# Patient Record
Sex: MALE | Race: WHITE | NOT HISPANIC OR LATINO | Employment: FULL TIME | URBAN - METROPOLITAN AREA
[De-identification: names, ages, dates, MRNs, and addresses within clinical notes are randomized per-mention and may not be internally consistent; named-entity substitution may affect disease eponyms.]

---
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
## 2021-11-20 ENCOUNTER — OFFICE VISIT (OUTPATIENT)
Dept: URGENT CARE | Facility: PHYSICIAN GROUP | Age: 21
End: 2021-11-20
Payer: OTHER GOVERNMENT

## 2021-11-20 ENCOUNTER — HOSPITAL ENCOUNTER (EMERGENCY)
Facility: MEDICAL CENTER | Age: 21
End: 2021-11-20
Attending: EMERGENCY MEDICINE
Payer: OTHER GOVERNMENT

## 2021-11-20 VITALS
SYSTOLIC BLOOD PRESSURE: 124 MMHG | WEIGHT: 165 LBS | HEIGHT: 72 IN | DIASTOLIC BLOOD PRESSURE: 72 MMHG | OXYGEN SATURATION: 99 % | TEMPERATURE: 99.1 F | HEART RATE: 96 BPM | BODY MASS INDEX: 22.35 KG/M2 | RESPIRATION RATE: 12 BRPM

## 2021-11-20 VITALS
BODY MASS INDEX: 21.38 KG/M2 | RESPIRATION RATE: 14 BRPM | OXYGEN SATURATION: 95 % | WEIGHT: 157.85 LBS | DIASTOLIC BLOOD PRESSURE: 75 MMHG | TEMPERATURE: 98.8 F | SYSTOLIC BLOOD PRESSURE: 126 MMHG | HEART RATE: 67 BPM | HEIGHT: 72 IN

## 2021-11-20 DIAGNOSIS — H18.891 CORNEAL RUST RING OF RIGHT EYE: ICD-10-CM

## 2021-11-20 DIAGNOSIS — T15.91XA FOREIGN BODY OF RIGHT EYE, INITIAL ENCOUNTER: ICD-10-CM

## 2021-11-20 PROCEDURE — 700101 HCHG RX REV CODE 250: Performed by: EMERGENCY MEDICINE

## 2021-11-20 PROCEDURE — 99283 EMERGENCY DEPT VISIT LOW MDM: CPT

## 2021-11-20 PROCEDURE — 99213 OFFICE O/P EST LOW 20 MIN: CPT | Performed by: FAMILY MEDICINE

## 2021-11-20 RX ORDER — ERYTHROMYCIN 5 MG/G
1 OINTMENT OPHTHALMIC 3 TIMES DAILY
Qty: 1 G | Refills: 0 | Status: SHIPPED | OUTPATIENT
Start: 2021-11-20 | End: 2021-11-23

## 2021-11-20 RX ORDER — COVID-19 MOLECULAR TEST ASSAY
KIT MISCELLANEOUS
COMMUNITY
Start: 2021-11-12 | End: 2021-11-20

## 2021-11-20 RX ORDER — ERYTHROMYCIN 5 MG/G
OINTMENT OPHTHALMIC ONCE
Status: COMPLETED | OUTPATIENT
Start: 2021-11-20 | End: 2021-11-20

## 2021-11-20 RX ADMIN — ERYTHROMYCIN: 5 OINTMENT OPHTHALMIC at 20:34

## 2021-11-20 RX ADMIN — FLUORESCEIN SODIUM 1 MG: 1 STRIP OPHTHALMIC at 20:13

## 2021-11-20 ASSESSMENT — LIFESTYLE VARIABLES
TOTAL SCORE: 0
EVER HAD A DRINK FIRST THING IN THE MORNING TO STEADY YOUR NERVES TO GET RID OF A HANGOVER: NO
DO YOU DRINK ALCOHOL: NO
HAVE PEOPLE ANNOYED YOU BY CRITICIZING YOUR DRINKING: NO
EVER FELT BAD OR GUILTY ABOUT YOUR DRINKING: NO
HAVE YOU EVER FELT YOU SHOULD CUT DOWN ON YOUR DRINKING: NO
TOTAL SCORE: 0
CONSUMPTION TOTAL: INCOMPLETE
TOTAL SCORE: 0

## 2021-11-21 NOTE — ED NOTES
Discharge teaching done with pt, verbalized understanding. Prescription given for erythromycin eye ointment with teaching. Pt instructed to follow up with ophthalmology as directed but return to ER for any worsening condition or if unable to follow up as directed. Pt denies further questions or concerns at time of discharge. Ambulates out with steady gait.

## 2021-11-21 NOTE — ED TRIAGE NOTES
"Chief Complaint   Patient presents with   • Foreign Body in Eye     Pt was seen at  and sent here for further treatment. Pt got metal in his eye on Thurs. Pt evaluated and told he has \"corneal rust ring of right eye\". Pt complaining of some pain.      /71   Pulse (!) 107   Temp 36.9 °C (98.4 °F) (Oral)   Resp 16   Ht 1.829 m (6')   Wt 71.6 kg (157 lb 13.6 oz)   SpO2 97%   BMI 21.41 kg/m²     Patient arrived to ED for the above complaint. Triage process explained to patient. Patient placed in lobby and told to notify staff of any changes.   "

## 2021-11-21 NOTE — ED PROVIDER NOTES
ED Provider Note    Chief Complaint:   Right eye foreign body    HPI:  Kaleb Mcmahon is a very pleasant 21-year-old gentleman who presents to the emergency department sent from urgent care for evaluation of a rust ring in the right eye.  He does metal work, and welding.  Suspect that he got a small piece of metal or sliver of metal in the right eye at some point for the past week.  He developed eye pain on Thursday, 3 days prior to arrival.  He has had persistent irritation and discomfort in the right eye since that time.  He has no significant vision changes.  He is not had any headaches, no nausea, no vomiting.  He went to urgent care, where they diagnosed him with a rust ring and sent him to the emergency department.    Review of Systems:  See HPI for pertinent positives and negatives. All other systems negative.    Past Medical History:   has a past medical history of Migraine.    Social History:  Social History     Tobacco Use   • Smoking status: Never Smoker   • Smokeless tobacco: Never Used   • Tobacco comment: continue to avoid   Vaping Use   • Vaping Use: Never used   Substance and Sexual Activity   • Alcohol use: Yes     Alcohol/week: 0.0 oz     Comment: rare   • Drug use: Yes     Types: Marijuana   • Sexual activity: Not on file       Surgical History:   has a past surgical history that includes hypospadias repair.    Current Medications:  Home Medications     Reviewed by Lisa Nevarez R.N. (Registered Nurse) on 11/20/21 at 1831  Med List Status: Not Addressed   Medication Last Dose Status        Patient Simone Taking any Medications                       Allergies:  No Known Allergies    Physical Exam:  Vital Signs: /75   Pulse 67   Temp 37.1 °C (98.8 °F) (Temporal)   Resp 14   Ht 1.829 m (6')   Wt 71.6 kg (157 lb 13.6 oz)   SpO2 95%   BMI 21.41 kg/m²   Constitutional: Alert, no acute distress  HENT: Normocephalic, mask in place  Pupils: equal, round and reactive  EOM:  intact  Conjunctiva: Mild conjunctival injection in the right eye  Foreign body: Punctate foreign body present with small corneal rust ring overlying the iris  Corneal abrasions, ulcerations or lacerations: absent with no stain uptake, no evidence of keratitis  Neck: Supple, normal range of motion, no stridor  Cardiovascular: No tachycardia  Pulmonary: No respiratory distress, normal work of breathing  Skin: Warm, dry, no rashes or lesions  Musculoskeletal: Normal range of motion in all extremities, no swelling or deformity noted  Neurologic: Alert, oriented, normal speech, normal motor function    Medical records reviewed for continuity of care.  No recent visits for similar symptoms available for review.  Urgent care note is not available for review.    ED Medications Administered:  Medications   erythromycin ophthalmic ointment (has no administration in time range)   fluorescein ophthalmic strip 1 mg (1 mg Both Eyes Given 11/20/21 2013)     MDM:  Mr. Mcmahon presents to the emergency department sent from urgent care for evaluation of a small corneal foreign body with rust ring.  I do not see any additional corneal abrasions nor lacerations, no stain uptake, no abnormalities on slit-lamp exam to suggest keratitis.  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  His pain is minimal, and can be controlled with Tylenol or ibuprofen.  He is referred to ophthalmology, Dr. Garcia is on call this evening.  He is counseled to call at the opening of business hours to schedule a follow-up appointment, and to return to the emergency department for assistance with an outpatient appointment if he is unable to schedule follow-up for any reason. Return precautions were discussed with the patient, and provided in written form with the patient's discharge instructions.  I did discharge him with a prescription for erythromycin ointment.    Personal protective equipment including N95 surgical  respirator, goggles, and gloves were used during this encounter.       Disposition:  Discharge home in stable condition    Final Impression:  1. Corneal rust ring of right eye        Electronically signed by: Marie Moses MD, 11/20/2021 8:24 PM

## 2021-11-21 NOTE — DISCHARGE INSTRUCTIONS
Please follow-up with the ophthalmologist listed above.  Call Monday morning at the opening of business hours, let them know you are seen in the emergency department and diagnosed with a corneal rust ring, due to a foreign body that has been in the eye for 3 days. If they aren't able to see you in clinic within 24 to 48 hours, please return to the emergency department Monday morning for assistance with setting up a follow-up appointment.  Please use the eye ointment as prescribed for comfort.  Return to the emergency department if you develop any new or worsening symptoms including worsening pain, drainage from the eye, redness, swelling around the eye, or any further concerns.

## 2021-11-21 NOTE — ED NOTES
"Pt to bed 67 ambulating with steady gait. Agree with triage nurse note. Pt reports UC \"got it out\" but sent for further evaluation r/t \"rust ring\". Sclera red to R eye. PERRL. Denies blurred vision, reports eye uncomfortable but denies pain.   Chart up for MD to see.   "

## 2021-11-25 ASSESSMENT — ENCOUNTER SYMPTOMS
WEIGHT LOSS: 0
FOCAL WEAKNESS: 0
SENSORY CHANGE: 0
BLURRED VISION: 0
DOUBLE VISION: 0

## 2021-11-25 NOTE — PROGRESS NOTES
Subjective     Kaleb Mcmahon is a 21 y.o. male who presents with Eye Problem (right eye redness and pain, got something in right on thursday night )            2 days small metallic foreign body right eye.  Eye is red irritated and draining.  + Photophobia.  He got metal in eye while he was grinding.  No vision loss.  No other aggravating or alleviating factors.       Review of Systems   Constitutional: Negative for malaise/fatigue and weight loss.   Eyes: Negative for blurred vision and double vision.   Neurological: Negative for sensory change and focal weakness.              Objective     /72 (BP Location: Right arm, Patient Position: Sitting, BP Cuff Size: Adult)   Pulse 96   Temp 37.3 °C (99.1 °F) (Temporal)   Resp 12   Ht 1.829 m (6')   Wt 74.8 kg (165 lb)   SpO2 99%   BMI 22.38 kg/m²      Physical Exam  Constitutional:       Appearance: Normal appearance.   Eyes:     Skin:     General: Skin is warm and dry.   Neurological:      Mental Status: He is alert.                             Assessment & Plan        1. Corneal rust ring of right eye    - Referral to Ophthalmology    2. Foreign body of right eye, initial encounter    - Referral to Ophthalmology    Differential diagnosis, natural history, supportive care, and indications for immediate follow-up discussed at length.

## 2022-11-04 ENCOUNTER — HOSPITAL ENCOUNTER (EMERGENCY)
Facility: MEDICAL CENTER | Age: 22
End: 2022-11-04
Attending: EMERGENCY MEDICINE
Payer: COMMERCIAL

## 2022-11-04 VITALS
TEMPERATURE: 96.7 F | DIASTOLIC BLOOD PRESSURE: 80 MMHG | OXYGEN SATURATION: 98 % | BODY MASS INDEX: 21.2 KG/M2 | SYSTOLIC BLOOD PRESSURE: 136 MMHG | HEART RATE: 64 BPM | RESPIRATION RATE: 18 BRPM | WEIGHT: 156.31 LBS

## 2022-11-04 DIAGNOSIS — S05.02XA ABRASION OF LEFT CORNEA, INITIAL ENCOUNTER: ICD-10-CM

## 2022-11-04 DIAGNOSIS — H18.892 CORNEAL RUST RING OF LEFT EYE: ICD-10-CM

## 2022-11-04 PROCEDURE — 99283 EMERGENCY DEPT VISIT LOW MDM: CPT

## 2022-11-04 PROCEDURE — 700101 HCHG RX REV CODE 250: Performed by: EMERGENCY MEDICINE

## 2022-11-04 RX ORDER — PROPARACAINE HYDROCHLORIDE 5 MG/ML
1 SOLUTION/ DROPS OPHTHALMIC ONCE
Status: COMPLETED | OUTPATIENT
Start: 2022-11-04 | End: 2022-11-04

## 2022-11-04 RX ORDER — POLYMYXIN B SULFATE AND TRIMETHOPRIM 1; 10000 MG/ML; [USP'U]/ML
2 SOLUTION OPHTHALMIC EVERY 4 HOURS
Qty: 10 ML | Refills: 0 | Status: SHIPPED | OUTPATIENT
Start: 2022-11-04 | End: 2022-11-09

## 2022-11-04 RX ADMIN — PROPARACAINE HYDROCHLORIDE 1 DROP: 5 SOLUTION/ DROPS OPHTHALMIC at 20:00

## 2022-11-04 RX ADMIN — FLUORESCEIN SODIUM 1 MG: 1 STRIP OPHTHALMIC at 20:00

## 2022-11-04 ASSESSMENT — LIFESTYLE VARIABLES
HAVE PEOPLE ANNOYED YOU BY CRITICIZING YOUR DRINKING: NO
EVER HAD A DRINK FIRST THING IN THE MORNING TO STEADY YOUR NERVES TO GET RID OF A HANGOVER: NO
CONSUMPTION TOTAL: NEGATIVE
HAVE YOU EVER FELT YOU SHOULD CUT DOWN ON YOUR DRINKING: NO
DO YOU DRINK ALCOHOL: YES
AVERAGE NUMBER OF DAYS PER WEEK YOU HAVE A DRINK CONTAINING ALCOHOL: 0
TOTAL SCORE: 0
ON A TYPICAL DAY WHEN YOU DRINK ALCOHOL HOW MANY DRINKS DO YOU HAVE: 0
TOTAL SCORE: 0
HOW MANY TIMES IN THE PAST YEAR HAVE YOU HAD 5 OR MORE DRINKS IN A DAY: 0
EVER FELT BAD OR GUILTY ABOUT YOUR DRINKING: NO
TOTAL SCORE: 0

## 2022-11-05 NOTE — ED TRIAGE NOTES
Chief Complaint   Patient presents with    Foreign Body in Eye     Metal in left eye after drilling steel last night     Pt reports that vision is intact. Pt reports that he rinsed eye, but can still feel foreign body.   BP (!) 142/70   Pulse 92   Temp 36.9 °C (98.5 °F) (Temporal)   Resp 16   Wt 70.9 kg (156 lb 4.9 oz)   SpO2 97%   Pt informed of wait times. Educated on triage process.  Asked to return to triage RN for any new or worsening of symptoms. Thanked for patience.

## 2022-11-05 NOTE — ED PROVIDER NOTES
ED Provider Note    CHIEF COMPLAINT  Chief Complaint   Patient presents with    Foreign Body in Eye     Metal in left eye after drilling steel last night        HPI  Kaleb Mcmahon is a 22 y.o. male who presents to the ED with complaints of foreign body sensation of the left eye.  The patient states that he was drilling some steel last night when he felt a marina go into his eye.  Patient tried to use a cotton tip about an hour or 2 after that to remove what he saw was a small marina of steel.  Patient continues to have pain to that left eye describes some redness but no discharge or visual acuity changes or any other symptoms presents for evaluation tetanus is up-to-date.    REVIEW OF SYSTEMS  See HPI for further details. All other systems are negative.     PAST MEDICAL HISTORY  Past Medical History:   Diagnosis Date    Migraine        FAMILY HISTORY  Family History   Problem Relation Age of Onset    Heart Disease Maternal Grandfather     Diabetes Neg Hx      Patient's family history has been discussed and is been found to be noncontributory to his present illness  SOCIAL HISTORY  Social History     Socioeconomic History    Marital status: Single   Tobacco Use    Smoking status: Never    Smokeless tobacco: Never    Tobacco comments:     continue to avoid   Vaping Use    Vaping Use: Never used   Substance and Sexual Activity    Alcohol use: Yes     Alcohol/week: 0.0 oz     Comment: rare    Drug use: Yes     Types: Marijuana      Pcp Pt States None        SURGICAL HISTORY  Past Surgical History:   Procedure Laterality Date    HYPOSPADIAS REPAIR         CURRENT MEDICATIONS  Home Medications       Reviewed by Alejandra Howell R.N. (Registered Nurse) on 11/04/22 at 1842  Med List Status: Partial     Medication Last Dose Status        Patient Simone Taking any Medications                           ALLERGIES  No Known Allergies    PHYSICAL EXAM  VITAL SIGNS: BP (!) 142/70   Pulse 92   Temp 36.9 °C (98.5 °F)  (Temporal)   Resp 16   Wt 70.9 kg (156 lb 4.9 oz)   SpO2 97%   BMI 21.20 kg/m²    Pulse Ox interpretation nonhypoxic    Constitutional: Well developed, Well nourished, No acute distress, Non-toxic appearance.   HENT: Normocephalic, Atraumatic, Bilateral external ears normal, bilateral tympanic membranes normal, Oropharynx moist, No oral exudates, Nose normal.   Eyes:  Lids/Lashes normal   Sclera slightly injected on the left the right is normal   Cornea patient has a small rust ring in the left lower quadrant of the cornea with a small abrasion surrounding it that is about 1 mm in diameter of the abrasion and the rust ring is very small less than a millimeter.  There is no overt foreign body seen.   Anterior Chamber normal no signs of cell and flare   Angle normal  Right Eye: 20/15  Left Eye: 20/25  Both Eyes: 20/20   Skin: Warm, Dry, No erythema,   Extremities: Intact distal pulses, no clubbing, no cyanosis, no edema, nontender.      RADIOLOGY/PROCEDURES  None    COURSE & MEDICAL DECISION MAKING  Pertinent Labs & Imaging studies reviewed. (See chart for details)  Patient does have a small corneal abrasion around the area of the rust ring it appears the patient did remove his own foreign body however there is still a retained rust ring as well as the corneal abrasion so we will start the patient on Polytrim ophthalmic.  Patient is to follow-up with ophthalmologist in 3 to 5 days for recheck and to discuss removal of the rust ring.  The patient is return if any symptoms worsen.  Tetanus is up-to-date.  FINAL IMPRESSION  1. Abrasion of left cornea, initial encounter  polymixin-trimethoprim (POLYTRIM) 09605-3.1 UNIT/ML-% Solution      2. Corneal rust ring of left eye           The patient will return for new or worsening symptoms and is stable at the time of discharge.    The patient is referred to a primary physician for blood pressure management, diabetic screening, and for all other preventative health  concerns.        DISPOSITION:  Patient will be discharged home in stable condition.    FOLLOW UP:  Mumtaz Irving M.D.  [address] [facility] [address]  [place] [address]  [phone]    Schedule an appointment as soon as possible for a visit   For re-check next week,, Return if any symptoms worsen      OUTPATIENT MEDICATIONS:  New Prescriptions    POLYMIXIN-TRIMETHOPRIM (POLYTRIM) 35962-8.1 UNIT/ML-% SOLUTION    Administer 2 Drops into the left eye every 4 hours for 5 days. Place 2 drops in affected eye(s) for 5-7 days.             Electronically signed by: Dionte Noriega M.D., 11/4/2022 8:05 PM

## 2022-11-05 NOTE — ED NOTES
Discharge teaching and paperwork provided regarding corneal abrasion and all questions/concerns answered. VSS,  Given information regarding home care and reasons to follow up with ED or primary MD. Patient provided ointment Rx. Patient discharged to the care of spouse  and ambulated out of the ED.

## 2024-02-09 ENCOUNTER — OFFICE VISIT (OUTPATIENT)
Dept: URGENT CARE | Facility: PHYSICIAN GROUP | Age: 24
End: 2024-02-09
Payer: COMMERCIAL

## 2024-02-09 VITALS
OXYGEN SATURATION: 96 % | DIASTOLIC BLOOD PRESSURE: 74 MMHG | WEIGHT: 152 LBS | BODY MASS INDEX: 20.59 KG/M2 | HEART RATE: 69 BPM | RESPIRATION RATE: 16 BRPM | TEMPERATURE: 98.4 F | SYSTOLIC BLOOD PRESSURE: 122 MMHG | HEIGHT: 72 IN

## 2024-02-09 DIAGNOSIS — H10.31 ACUTE BACTERIAL CONJUNCTIVITIS OF RIGHT EYE: ICD-10-CM

## 2024-02-09 DIAGNOSIS — H66.002 NON-RECURRENT ACUTE SUPPURATIVE OTITIS MEDIA OF LEFT EAR WITHOUT SPONTANEOUS RUPTURE OF TYMPANIC MEMBRANE: ICD-10-CM

## 2024-02-09 PROCEDURE — 3074F SYST BP LT 130 MM HG: CPT | Performed by: PHYSICIAN ASSISTANT

## 2024-02-09 PROCEDURE — 3078F DIAST BP <80 MM HG: CPT | Performed by: PHYSICIAN ASSISTANT

## 2024-02-09 PROCEDURE — 99214 OFFICE O/P EST MOD 30 MIN: CPT | Performed by: PHYSICIAN ASSISTANT

## 2024-02-09 RX ORDER — AMOXICILLIN 875 MG/1
875 TABLET, COATED ORAL 2 TIMES DAILY
Qty: 14 TABLET | Refills: 0 | Status: SHIPPED | OUTPATIENT
Start: 2024-02-09 | End: 2024-02-16

## 2024-02-09 RX ORDER — POLYMYXIN B SULFATE AND TRIMETHOPRIM 1; 10000 MG/ML; [USP'U]/ML
1 SOLUTION OPHTHALMIC EVERY 4 HOURS
Qty: 10 ML | Refills: 0 | Status: SHIPPED | OUTPATIENT
Start: 2024-02-09 | End: 2024-02-16

## 2024-02-09 ASSESSMENT — ENCOUNTER SYMPTOMS
EYE PAIN: 0
BLURRED VISION: 0
EYE REDNESS: 1
COUGH: 0
FEVER: 0
DOUBLE VISION: 0
PHOTOPHOBIA: 0
EYE DISCHARGE: 1
CHILLS: 0

## 2024-02-09 ASSESSMENT — VISUAL ACUITY: OU: 1

## 2024-02-09 NOTE — PROGRESS NOTES
Subjective:   Kaleb Mcmahon is a 24 y.o. male who presents today with   Chief Complaint   Patient presents with    Otalgia     Left Ear x 1 day    Pink Eye     Right eye x 1 day       Otalgia   There is pain in the left ear. This is a new problem. The current episode started today. The problem occurs constantly. The problem has been unchanged. There has been no fever. Pertinent negatives include no coughing. He has tried nothing for the symptoms. The treatment provided no relief.     Patient denies any recent injury or trauma to his eye.  He states yesterday he started having discharge from the right eye.  He does not wear contacts.    PMH:  has a past medical history of Migraine.  MEDS:   Current Outpatient Medications:     amoxicillin (AMOXIL) 875 MG tablet, Take 1 Tablet by mouth 2 times a day for 7 days., Disp: 14 Tablet, Rfl: 0    polymixin-trimethoprim (POLYTRIM) 36160-8.1 UNIT/ML-% Solution, Administer 1 Drop into the right eye every 4 hours for 7 days., Disp: 10 mL, Rfl: 0  ALLERGIES: No Known Allergies  SURGHX:   Past Surgical History:   Procedure Laterality Date    HYPOSPADIAS REPAIR       SOCHX:  reports that he has never smoked. He has never used smokeless tobacco. He reports current alcohol use. He reports current drug use. Drug: Marijuana.  FH: Reviewed with patient, not pertinent to this visit.       Review of Systems   Constitutional:  Negative for chills and fever.   HENT:  Positive for ear pain.    Eyes:  Positive for discharge and redness. Negative for blurred vision, double vision, photophobia and pain.   Respiratory:  Negative for cough.         Objective:   /74 (BP Location: Right arm, Patient Position: Sitting, BP Cuff Size: Adult)   Pulse 69   Temp 36.9 °C (98.4 °F) (Temporal)   Resp 16   Ht 1.829 m (6')   Wt 68.9 kg (152 lb)   SpO2 96%   BMI 20.61 kg/m²   Physical Exam  Vitals and nursing note reviewed.   Constitutional:       General: He is not in acute  distress.     Appearance: Normal appearance. He is well-developed. He is not ill-appearing or toxic-appearing.   HENT:      Head: Normocephalic and atraumatic.      Right Ear: Hearing, tympanic membrane and ear canal normal.      Left Ear: Hearing and ear canal normal. A middle ear effusion is present. Tympanic membrane is erythematous.   Eyes:      General: Vision grossly intact.         Right eye: Discharge present. No foreign body or hordeolum.      Extraocular Movements: Extraocular movements intact.      Conjunctiva/sclera:      Right eye: Right conjunctiva is injected.      Pupils: Pupils are equal, round, and reactive to light.      Comments: No uptake noted with fluorescein stain on Woods lamp exam.   Cardiovascular:      Rate and Rhythm: Normal rate and regular rhythm.      Heart sounds: Normal heart sounds.   Pulmonary:      Effort: Pulmonary effort is normal. No respiratory distress.      Breath sounds: Normal breath sounds. No stridor. No wheezing, rhonchi or rales.   Musculoskeletal:      Comments: Normal movement in all 4 extremities   Skin:     General: Skin is warm and dry.   Neurological:      Mental Status: He is alert.      Coordination: Coordination normal.   Psychiatric:         Mood and Affect: Mood normal.         Assessment/Plan:   Assessment    1. Non-recurrent acute suppurative otitis media of left ear without spontaneous rupture of tympanic membrane  - amoxicillin (AMOXIL) 875 MG tablet; Take 1 Tablet by mouth 2 times a day for 7 days.  Dispense: 14 Tablet; Refill: 0    2. Acute bacterial conjunctivitis of right eye  - polymixin-trimethoprim (POLYTRIM) 33994-4.1 UNIT/ML-% Solution; Administer 1 Drop into the right eye every 4 hours for 7 days.  Dispense: 10 mL; Refill: 0      Symptoms and presentation appear consistent with left-sided ear infection as well as right-sided bacterial conjunctivitis.  Recommend treating with oral antibiotics as well as antibiotic drops.    Differential  diagnosis, natural history, supportive care, and indications for immediate follow-up discussed.   Patient given instructions and understanding of medications and treatment.    If not improving in 3-5 days, F/U with PCP or return to UC if symptoms worsen.    Patient agreeable to plan.      Please note that this dictation was created using voice recognition software. I have made every reasonable attempt to correct obvious errors, but I expect that there are errors of grammar and possibly content that I did not discover before finalizing the note.    Bryant Turner PA-C